# Patient Record
Sex: FEMALE | Race: WHITE | NOT HISPANIC OR LATINO | Employment: UNEMPLOYED | URBAN - METROPOLITAN AREA
[De-identification: names, ages, dates, MRNs, and addresses within clinical notes are randomized per-mention and may not be internally consistent; named-entity substitution may affect disease eponyms.]

---

## 2023-03-11 ENCOUNTER — HOSPITAL ENCOUNTER (EMERGENCY)
Facility: CLINIC | Age: 53
Discharge: HOME | End: 2023-03-11
Attending: EMERGENCY MEDICINE | Admitting: EMERGENCY MEDICINE
Payer: COMMERCIAL

## 2023-03-11 VITALS
TEMPERATURE: 97.5 F | RESPIRATION RATE: 18 BRPM | HEART RATE: 85 BPM | HEIGHT: 66 IN | OXYGEN SATURATION: 97 % | WEIGHT: 134 LBS | BODY MASS INDEX: 21.53 KG/M2 | SYSTOLIC BLOOD PRESSURE: 146 MMHG | DIASTOLIC BLOOD PRESSURE: 96 MMHG

## 2023-03-11 DIAGNOSIS — F11.20 METHADONE MAINTENANCE THERAPY PATIENT (H): ICD-10-CM

## 2023-03-11 PROCEDURE — 99283 EMERGENCY DEPT VISIT LOW MDM: CPT

## 2023-03-11 PROCEDURE — 250N000013 HC RX MED GY IP 250 OP 250 PS 637: Performed by: EMERGENCY MEDICINE

## 2023-03-11 RX ORDER — METHADONE HYDROCHLORIDE 10 MG/ML
100 CONCENTRATE ORAL ONCE
Status: COMPLETED | OUTPATIENT
Start: 2023-03-11 | End: 2023-03-11

## 2023-03-11 RX ADMIN — METHADONE HYDROCHLORIDE 100 MG: 10 CONCENTRATE ORAL at 09:24

## 2023-03-11 ASSESSMENT — ACTIVITIES OF DAILY LIVING (ADL): ADLS_ACUITY_SCORE: 35

## 2023-03-11 NOTE — ED PROVIDER NOTES
"  History     Chief Complaint:  Medication Refill       HPI   Argelia Nova is a 52 year old female with a history of drug dependence who presents for need for methadone dose. She is from [place] but is here visiting her mother who is in the hospital. Because of this she experienced difficulties with her methadone clinic and will not be able to receive her dose today. She denies any other medical concerns.      Independent Historian:   None - Patient Only    Review of External Notes: Reviewed prior records from [place] including history of intentional overdose and narcotic dependence.      ROS:  Review of Systems   All other systems reviewed and are negative.      Allergies:  Cephalexin  Cyclobenzaprine HCl  Sulfa drugs     Medications:    Methadone  Lipitor  Zoloft  Neurontin  Zofran    Past Medical History:    ESBL  Suicidal ideation  Methadone maintenance therapy  Hypercholesterolemia  LUCINDA  Tobacco use  Major depressive disorder  High triglycerides  Drug dependence  Migraine  Pyelonephritis  Dysplasia of cervix  Recurrent UTI    Past Surgical History:    Right foot debridement of exostosis  Appendectomy  Lap, diagnostic abdomen  Cystourethroscopy  Closed Rx nose fracture  Hysterectomy  Knee Arthroscopy x3 right  Rhinoplasty  [place] teeth  Finger surgery    Family History:    Mother: Alcohol, anxiety, CABG, colon polyp  Father: Myocardial infarction  Sister: Diabetes, endocrine disease    Social History:  Patient came from hospital where her mother is being treated.  Patient is accompanied in the ED.  PCP: No primary care provider on file.     Physical Exam     Patient Vitals for the past 24 hrs:   BP Temp Temp src Pulse Resp SpO2 Height Weight   03/11/23 0759 -- -- -- -- -- 97 % -- --   03/11/23 0758 -- 97.5  F (36.4  C) Oral -- 18 -- 1.676 m (5' 6\") 60.8 kg (134 lb)   03/11/23 0754 (!) 146/96 -- -- 85 -- -- -- --        Physical Exam  Head:  The scalp, face, and head appear normal  Eyes:  Conjunctivae are " normal  CV:  Normal rate,  Resp:  No respiratory distress   Musc:  Normal muscular tone  Skin:  No rash or lesions noted  Neuro: Speech is normal and fluent. Face is symmetric. Moving all extremities well.       Emergency Department Course     Emergency Department Course & Assessments:       Interventions:  Medications   methadone (DOLOPHINE-INTENSOL) 10 MG/ML (HIGH CONC) solution 100 mg (has no administration in time range)          Consultations/Discussion of Management or Tests:  0740 I obtained history and examined the patient as noted above.  0744 I spoke with the patients methadone clinic to approve a dose.  0818 I spoke with the patients methadone clinic for further verification.  0844 I rechecked the patient and explained findings.    Social Determinants of Health affecting care:   Stress/Adjustment Disorders    Disposition:  The patient was discharged to home.     Impression & Plan      Medical Decision Making:  Patient presents with need for methadone dose.  Patient is from [place] and is here today because her mother is hospitalized upstairs and is not doing well.  I did contact her methadone clinic in [place] ([facility]) and verified her dose of 100 mg of liquid methadone daily.  Her last dose was yesterday morning.  I spoke with Cheyanne personally who verified the dose.  Patient did sign a release of information that was faxed to facilitate this conversation.  In discussion with her clinic, if patient were to need another dose tomorrow, she would need to return to the emergency department here which I discussed with patient.  Finally, discussed that if she needs to remain in the [facility], that she would need to work with her clinic to transition to a methadone clinic locally which she voices understanding and states she tried to get organized, but was unable to due to the weekend. She is discharged in stable condition. All questions answered.     Diagnosis:    ICD-10-CM    1. Methadone maintenance therapy  patient (H)  F11.20            Scribe Disclosure:  I, ANKUR GOMES, am serving as a scribe at 7:44 AM on 3/11/2023 to document services personally performed by Nolan Hutchison MD based on my observations and the provider's statements to me.            Nolan Hutchison MD  03/12/23 0048

## 2023-03-11 NOTE — DISCHARGE INSTRUCTIONS
We discussed with Blanquita and gave you today's methadone dose.     Ok to return to emergency department tomorrow for next dose if needed.     Then if going to be in the area for additional days, will need to coordinate with [place] on Monday to make alternate local arrangements.

## 2023-03-11 NOTE — ED TRIAGE NOTES
Patient is from [place]. Patient reports she has been taking methadone for over 12 years and she is here on an emergency visit to see her mother who is hospitalized. Patient reports her methadone clinic is able to be contacted until 9am this morning to verify her dose.

## 2023-03-12 ENCOUNTER — HOSPITAL ENCOUNTER (EMERGENCY)
Facility: CLINIC | Age: 53
Discharge: HOME | End: 2023-03-12
Attending: EMERGENCY MEDICINE | Admitting: EMERGENCY MEDICINE
Payer: COMMERCIAL

## 2023-03-12 VITALS
TEMPERATURE: 98.9 F | RESPIRATION RATE: 18 BRPM | OXYGEN SATURATION: 99 % | SYSTOLIC BLOOD PRESSURE: 170 MMHG | HEIGHT: 66 IN | HEART RATE: 76 BPM | BODY MASS INDEX: 21.54 KG/M2 | DIASTOLIC BLOOD PRESSURE: 86 MMHG

## 2023-03-12 DIAGNOSIS — F11.20 METHADONE MAINTENANCE THERAPY PATIENT (H): ICD-10-CM

## 2023-03-12 PROCEDURE — 99283 EMERGENCY DEPT VISIT LOW MDM: CPT

## 2023-03-12 PROCEDURE — 250N000013 HC RX MED GY IP 250 OP 250 PS 637: Performed by: EMERGENCY MEDICINE

## 2023-03-12 RX ORDER — METHADONE HYDROCHLORIDE 10 MG/ML
100 CONCENTRATE ORAL ONCE
Status: COMPLETED | OUTPATIENT
Start: 2023-03-12 | End: 2023-03-12

## 2023-03-12 RX ADMIN — METHADONE HYDROCHLORIDE 100 MG: 10 CONCENTRATE ORAL at 13:05

## 2023-03-12 ASSESSMENT — ACTIVITIES OF DAILY LIVING (ADL): ADLS_ACUITY_SCORE: 35

## 2023-03-12 NOTE — ED TRIAGE NOTES
Pt sa Dr Monroy yesterday - had instructions to get second dose of methadone today . Mother is unwell      Triage Assessment     Row Name 03/12/23 1300       Triage Assessment (Adult)    Airway WDL WDL       Respiratory WDL    Respiratory WDL WDL       Skin Circulation/Temperature WDL    Skin Circulation/Temperature WDL WDL       Cardiac WDL    Cardiac WDL --  na       Peripheral/Neurovascular WDL    Peripheral Neurovascular WDL WDL       Cognitive/Neuro/Behavioral WDL    Cognitive/Neuro/Behavioral WDL WDL

## 2023-03-12 NOTE — ED PROVIDER NOTES
"    History     Chief Complaint:  Methadone dose    The history is provided by the patient.      Argelia Nova is a 52 year old female with a history of methadone maintenance therapy who presents for a methadone dose. The patient follows with a methadone clinic located in [place]. The patient is here due to her mother being admitted to the hospital essentially on end-of-life care.The patient presented to the ED yesterday and her methadone dose was confirmed. Given it's the weekend, she is unable to make alternate arrangements until tomorrow for her methadone dosing.     Independent Historian: No independent historian      Review of External Notes: Reviewed prior records from [place].     ROS:  Review of Systems   All other systems reviewed and are negative.      Allergies:  Cephalexin  Cyclobenzaprine HCl  Sulfa drugs     Medications:    Methadone  Lipitor  Zoloft  Neurontin  Zofran    Past Medical History:    ESBL  Suicidal ideation  Methadone maintenance therapy  Hypercholesterolemia  LUCINDA  Tobacco use  Major depressive disorder  High triglycerides  Drug dependence  Migraine  Pyelonephritis  Dysplasia of cervix  Recurrent UTI    Past Surgical History:    Right foot debridement of exostosis  Appendectomy  Lap, diagnostic abdomen  Cystourethroscopy  Closed Rx nose fracture  Hysterectomy  Knee Arthroscopy x3 right  Rhinoplasty  [place] teeth  Finger surgery     Family History:    Alcohol abuse  Anxiety   Colon polyps   Myocardial infarction   Diabetes   Endocrine disease    Social History:  The patient primary lives in [place].   She presents to the ED alone.   PCP: [place]     Physical Exam     Patient Vitals for the past 24 hrs:   BP Temp Temp src Pulse Resp SpO2 Height   03/12/23 1304 (!) 170/86 98.9  F (37.2  C) Oral 76 18 99 % 1.68 m (5' 6.14\")        Physical Exam    Head:  The scalp, face, and head appear normal  Eyes:  Conjunctivae are normal  CV:  Normal rate  Resp:  No respiratory " distress   Musc:  Normal muscular tone  Skin:  No rash or lesions noted  Neuro: Speech is normal and fluent. Face is symmetric. Moving all extremities well.             Emergency Department Course   Emergency Department Course & Assessments:       Interventions:  Medications   methadone (DOLOPHINE-INTENSOL) 10 MG/ML (HIGH CONC) solution 100 mg (100 mg Oral $Given 3/12/23 1305)        Social Determinants of Health affecting care:  Stress/Adjustment Disorders  Hx substance abuse.      Assessments:  1255 I obtained history and examined the patient as noted above.  We discussed plan for methadone dose & discharge and patient is in agreement with plan.     Disposition:  The patient was discharged to home.     Impression & Plan        Medical Decision Making:  Pt presents for repeat methadone dose. Unfortunately her mother is on end-of-life care upstairs and pt is from out of town. I verified her methadone dose yesterday. She was given her daily dose today as well. She does know moving forward that if she continues to be in the [facility], she will need to make alternate arrangements. She tried to contact [facility] on Friday, but there wasn't enough time to get the faxes sent back and forth from her clinic in [place]. She understands that she will need to make alternate plans tomorrow (Monday) and that we will not be able to continue to facilitate this from the emergency department. She's discharged in stable condition.       Diagnosis:    ICD-10-CM    1. Methadone maintenance therapy patient (H)  F11.20          Scribe Disclosure:  Merced CHATTERJEE, am serving as a scribe at 12:59 PM on 3/12/2023 to document services personally performed by Nolan Hutchison MD based on my observations and the provider's statements to me.               Nolan Hutchison MD  03/13/23 6015

## 2023-03-14 NOTE — PHARMACY
3/14/23  Received call from Luis Fernando Munoz (part of [facility])Cheyanne LPN requesting information about methadone doses on 3/11/23 and 3/12/23.  Information given.